# Patient Record
Sex: FEMALE | Race: BLACK OR AFRICAN AMERICAN | NOT HISPANIC OR LATINO | Employment: UNEMPLOYED | URBAN - METROPOLITAN AREA
[De-identification: names, ages, dates, MRNs, and addresses within clinical notes are randomized per-mention and may not be internally consistent; named-entity substitution may affect disease eponyms.]

---

## 2020-07-30 ENCOUNTER — OFFICE VISIT (OUTPATIENT)
Dept: URGENT CARE | Facility: URGENT CARE | Age: 65
End: 2020-07-30
Payer: COMMERCIAL

## 2020-07-30 ENCOUNTER — NURSE TRIAGE (OUTPATIENT)
Dept: NURSING | Facility: CLINIC | Age: 65
End: 2020-07-30

## 2020-07-30 ENCOUNTER — COMMUNICATION - HEALTHEAST (OUTPATIENT)
Dept: SCHEDULING | Facility: CLINIC | Age: 65
End: 2020-07-30

## 2020-07-30 VITALS
BODY MASS INDEX: 24.24 KG/M2 | TEMPERATURE: 99.4 F | WEIGHT: 142 LBS | DIASTOLIC BLOOD PRESSURE: 136 MMHG | OXYGEN SATURATION: 97 % | HEART RATE: 96 BPM | SYSTOLIC BLOOD PRESSURE: 230 MMHG | HEIGHT: 64 IN

## 2020-07-30 DIAGNOSIS — I16.9 HYPERTENSIVE CRISIS: ICD-10-CM

## 2020-07-30 DIAGNOSIS — B02.9 HERPES ZOSTER WITHOUT COMPLICATION: Primary | ICD-10-CM

## 2020-07-30 PROCEDURE — 99204 OFFICE O/P NEW MOD 45 MIN: CPT | Performed by: FAMILY MEDICINE

## 2020-07-30 PROCEDURE — 93000 ELECTROCARDIOGRAM COMPLETE: CPT | Performed by: FAMILY MEDICINE

## 2020-07-30 RX ORDER — VALACYCLOVIR HYDROCHLORIDE 1 G/1
1000 TABLET, FILM COATED ORAL 3 TIMES DAILY
Qty: 21 TABLET | Refills: 0 | Status: SHIPPED | OUTPATIENT
Start: 2020-07-30 | End: 2020-08-06

## 2020-07-30 ASSESSMENT — MIFFLIN-ST. JEOR: SCORE: 1184.11

## 2020-07-30 NOTE — PROGRESS NOTES
"Subjective:   Ubaldo Medina is a 65 year old female who presents for   Chief Complaint   Patient presents with     Urgent Care     Pt in clinic to have eval for painful facial rash.     Derm Problem     Rash started on Monday (3 days ago)  has not had anything like this before.   She denies any new creams or lotions. No fevers.   The pain has improved over the last few days   Right side of the face.   No facial droop seen. No pain of the eye or blurry vision.   No rash on the nose or forehead. Present on lateral right side and along the neck.     Hearing is without issue and no pain in the ear canal    Denies headache or visual changes.     No reported chest pain or breathing difficulties.     Remedies attempted: vaseline w/ ta     SH: [place], patient lives in [place]  PMH: [facility] she reports diagnosis of high blood pressure she has not been prescribed   PCP: no regular primary care appointments    There are no active problems to display for this patient.      Current Outpatient Medications   Medication     valACYclovir (VALTREX) 1000 mg tablet     No current facility-administered medications for this visit.      ROS:   ROS: 10 point ROS neg other than the symptoms noted above in the HPI.      Objective:   BP (!) 230/136 (BP Location: Left arm, Cuff Size: Adult Regular)   Pulse 96   Temp 99.4  F (37.4  C) (Oral)   Ht 1.626 m (5' 4\")   Wt 64.4 kg (142 lb)   SpO2 97%   BMI 24.37 kg/m  , Body mass index is 24.37 kg/m .  Gen:  NAD, well-nourished, sitting in chair comfortably  HEENT: EOMI, sclera anicteric, Head normocephalic, ; nares patent; moist mucous membranes, entry of the right ear vesicular lesions are seen -  in the canal there is small amounts of cerumen but no obvious lesions of the canal, normal TM, vesicular fluid filled rash/ present on right lateral neck  Neck: trachea midline, no thyromegaly  CV:  Hemodynamically stable, tachycardic, regular, no obvious murmurs  Pulm:  " no increased work of breathing , CTAB, no wheezes/rales/rhonchi   ABD: soft, non-distended  Neuro : no facial droop, symmetric facial expressions  Extrem: no cyanosis, edema or clubbing  Skin: no obvious rashes or abnormalities  Psych: Euthymic, linear thoughts, normal rate of speech            No results found for any visits on 07/30/20.  EKG: sinus tachy, no ST elevation, T wave inversion seen in multiple leads    Assessment & Plan:   Ubaldo Medina, 65 year old female who presents with:    Herpes zoster without complication  - valACYclovir (VALTREX) 1000 mg tablet  Dispense: 21 tablet; Refill: 0  C2/C3 dermatomal involvement   Tylenol recommended for discomfort.   Should monitor closely for possible development of americo hunt syndrome, at this present time no hearing difficulties or facial droop. No reported eye issues. Absent of rash on face or on tip of nose.     Consider dose adjustment based on findings after assessing renal function when prescribing valtrex.   Patient was given a Rx initially but after review of her vitals/BP she was recommended to follow the plan as below.     Hypertensive Crisis  At 1436 blood pressure readings 234/136, 240 /126 . Patient denies headache or visual changes. She does not have regular care with a primary care doctor. Care is fragmented between here and [facility]. We are  unable to check and assess renal function here in clinic given these circumstances and this severe BP I have recommended evaluation in the ED for assessment.     Spoke with [facility] ED at 1543 to discuss the case with the ED physician      Osbaldo Rodriguez MD   [facility]    The use of Dragon/Tr[employer] dictation services may have been used to construct the content in this note; any grammatical or spelling errors are non-intentional. Please contact the author of this note directly if you are in need of any clarification.

## 2020-07-30 NOTE — TELEPHONE ENCOUNTER
"    Additional Information    Negative: Difficult to awaken or acting confused (e.g., disoriented, slurred speech)    Negative: Sounds like a life-threatening emergency to the triager    Negative: Localized rash and doesn't match the SYMPTOMS of shingles    Negative: Back pain and doesn't match the SYMPTOMS of shingles    Negative: Shingles Vaccine (Recombinant Zoster Vaccine; RZV; Shingrix), questions about    Negative: Shingles rash of face and eye pain or blurred vision    Negative: Shingles rash on the eyelid or tip of the nose    Negative: Shingles rash and spots start appearing other places on body    Negative: Patient sounds very sick or weak to the triager    Shingles rash of face or ear and earache or ringing in the ear     Advised Urgent Care    Negative: Shingles rash of face and facial weakness    Negative: Shingles rash (matches SYMPTOMS) and weak immune system (e.g., HIV positive,  cancer chemotherapy, chronic steroid treatment, splenectomy) and NOT taking antiviral medication    Answer Assessment - Initial Assessment Questions  1. APPEARANCE of RASH: \"Describe the rash.\"       Painful blisters  2. LOCATION: \"Where is the rash located?\"       Right ear  3. ONSET: \"When did the rash start?\"       Past few days  4. ITCHING: \"Does the rash itch?\" If so, ask: \"How bad is the itch?\"  (Scale 1-10; or mild, moderate, severe)      yes  5. PAIN: \"Does the rash hurt?\" If so, ask: \"How bad is the pain?\"  (Scale 1-10; or mild, moderate, severe)      yes  6. OTHER SYMPTOMS: \"Do you have any other symptoms?\" (e.g., fever)      no  7. PREGNANCY: \"Is there any chance you are pregnant?\" \"When was your last menstrual period?\"      no    Protocols used: SHINGLES-A-OH      "

## 2020-08-07 ENCOUNTER — OFFICE VISIT - HEALTHEAST (OUTPATIENT)
Dept: INTERNAL MEDICINE | Facility: CLINIC | Age: 65
End: 2020-08-07

## 2020-08-07 ENCOUNTER — MEDICAL CORRESPONDENCE (OUTPATIENT)
Dept: HEALTH INFORMATION MANAGEMENT | Facility: CLINIC | Age: 65
End: 2020-08-07

## 2020-08-07 DIAGNOSIS — E11.9 TYPE 2 DIABETES MELLITUS WITHOUT COMPLICATION, WITHOUT LONG-TERM CURRENT USE OF INSULIN (H): ICD-10-CM

## 2020-08-07 DIAGNOSIS — Z12.31 VISIT FOR SCREENING MAMMOGRAM: ICD-10-CM

## 2020-08-07 DIAGNOSIS — I10 HYPERTENSION, UNSPECIFIED TYPE: ICD-10-CM

## 2020-08-07 DIAGNOSIS — R73.9 ELEVATED BLOOD SUGAR: ICD-10-CM

## 2020-08-07 LAB
ALBUMIN SERPL-MCNC: 3.8 G/DL (ref 3.5–5)
ALP SERPL-CCNC: 94 U/L (ref 45–120)
ALT SERPL W P-5'-P-CCNC: 32 U/L (ref 0–45)
AST SERPL W P-5'-P-CCNC: 21 U/L (ref 0–40)
BILIRUB DIRECT SERPL-MCNC: 0.2 MG/DL
BILIRUB SERPL-MCNC: 0.4 MG/DL (ref 0–1)
HBA1C MFR BLD: 13.2 %
PROT SERPL-MCNC: 6.9 G/DL (ref 6–8)

## 2020-08-07 ASSESSMENT — MIFFLIN-ST. JEOR: SCORE: 1197.25

## 2020-08-10 ENCOUNTER — AMBULATORY - HEALTHEAST (OUTPATIENT)
Dept: INTERNAL MEDICINE | Facility: CLINIC | Age: 65
End: 2020-08-10

## 2020-08-10 ENCOUNTER — NURSE TRIAGE (OUTPATIENT)
Dept: NURSING | Facility: CLINIC | Age: 65
End: 2020-08-10

## 2020-08-10 ENCOUNTER — COMMUNICATION - HEALTHEAST (OUTPATIENT)
Dept: SCHEDULING | Facility: CLINIC | Age: 65
End: 2020-08-10

## 2020-08-10 ENCOUNTER — TELEPHONE (OUTPATIENT)
Dept: ADMINISTRATIVE | Facility: CLINIC | Age: 65
End: 2020-08-10

## 2020-08-10 NOTE — TELEPHONE ENCOUNTER
Diabetes Education Scheduling Outreach #1:    Call to patient to schedule. Left message with phone number to call to schedule.    Plan for 2nd outreach attempt within 1 week.    Sanjana Jaimes  [facility]  Diabetes and Nutrition Scheduling

## 2020-08-11 NOTE — TELEPHONE ENCOUNTER
Diabetes Education Scheduling Outreach #2:    Call to patient to schedule. Left message with phone number to call to schedule.    Sanjana Jaimes  [facility]  Diabetes and Nutrition Scheduling

## 2020-08-20 ENCOUNTER — OFFICE VISIT - HEALTHEAST (OUTPATIENT)
Dept: INTERNAL MEDICINE | Facility: CLINIC | Age: 65
End: 2020-08-20

## 2020-08-20 ENCOUNTER — COMMUNICATION - HEALTHEAST (OUTPATIENT)
Dept: INTERNAL MEDICINE | Facility: CLINIC | Age: 65
End: 2020-08-20

## 2020-08-20 DIAGNOSIS — E11.9 TYPE 2 DIABETES MELLITUS WITHOUT COMPLICATION, WITHOUT LONG-TERM CURRENT USE OF INSULIN (H): ICD-10-CM

## 2020-08-20 DIAGNOSIS — I10 HYPERTENSION, UNSPECIFIED TYPE: ICD-10-CM

## 2020-08-20 LAB
CHOLEST SERPL-MCNC: 141 MG/DL
FASTING STATUS PATIENT QL REPORTED: YES
HDLC SERPL-MCNC: 41 MG/DL
LDLC SERPL CALC-MCNC: 58 MG/DL
TRIGL SERPL-MCNC: 210 MG/DL

## 2020-08-24 ENCOUNTER — COMMUNICATION - HEALTHEAST (OUTPATIENT)
Dept: INTERNAL MEDICINE | Facility: CLINIC | Age: 65
End: 2020-08-24

## 2020-08-24 ENCOUNTER — PATIENT OUTREACH (OUTPATIENT)
Dept: EDUCATION SERVICES | Facility: CLINIC | Age: 65
End: 2020-08-24
Payer: COMMERCIAL

## 2020-08-24 DIAGNOSIS — Z53.9 NO SHOW: Primary | ICD-10-CM

## 2020-08-24 DIAGNOSIS — E11.9 TYPE 2 DIABETES MELLITUS WITHOUT COMPLICATION, WITHOUT LONG-TERM CURRENT USE OF INSULIN (H): ICD-10-CM

## 2020-08-25 ENCOUNTER — RECORDS - HEALTHEAST (OUTPATIENT)
Dept: ADMINISTRATIVE | Facility: OTHER | Age: 65
End: 2020-08-25

## 2020-08-25 LAB — RETINOPATHY: NEGATIVE

## 2020-08-31 ENCOUNTER — RECORDS - HEALTHEAST (OUTPATIENT)
Dept: HEALTH INFORMATION MANAGEMENT | Facility: CLINIC | Age: 65
End: 2020-08-31

## 2020-09-04 ENCOUNTER — COMMUNICATION - HEALTHEAST (OUTPATIENT)
Dept: SCHEDULING | Facility: CLINIC | Age: 65
End: 2020-09-04

## 2020-09-08 ENCOUNTER — COMMUNICATION - HEALTHEAST (OUTPATIENT)
Dept: INTERNAL MEDICINE | Facility: CLINIC | Age: 65
End: 2020-09-08

## 2020-09-08 DIAGNOSIS — I10 HYPERTENSION, UNSPECIFIED TYPE: ICD-10-CM

## 2020-09-18 ENCOUNTER — COMMUNICATION - HEALTHEAST (OUTPATIENT)
Dept: INTERNAL MEDICINE | Facility: CLINIC | Age: 65
End: 2020-09-18

## 2020-09-18 DIAGNOSIS — E11.9 TYPE 2 DIABETES MELLITUS WITHOUT COMPLICATION, WITHOUT LONG-TERM CURRENT USE OF INSULIN (H): ICD-10-CM

## 2020-11-22 ENCOUNTER — HEALTH MAINTENANCE LETTER (OUTPATIENT)
Age: 65
End: 2020-11-22

## 2020-12-22 ENCOUNTER — OFFICE VISIT - HEALTHEAST (OUTPATIENT)
Dept: INTERNAL MEDICINE | Facility: CLINIC | Age: 65
End: 2020-12-22

## 2020-12-22 DIAGNOSIS — E11.00 TYPE 2 DIABETES MELLITUS WITH HYPEROSMOLARITY WITHOUT COMA, WITHOUT LONG-TERM CURRENT USE OF INSULIN (H): ICD-10-CM

## 2020-12-22 DIAGNOSIS — I10 HYPERTENSION, UNSPECIFIED TYPE: ICD-10-CM

## 2020-12-22 LAB
ANION GAP SERPL CALCULATED.3IONS-SCNC: 13 MMOL/L (ref 5–18)
BUN SERPL-MCNC: 12 MG/DL (ref 8–22)
CALCIUM SERPL-MCNC: 9.5 MG/DL (ref 8.5–10.5)
CHLORIDE BLD-SCNC: 105 MMOL/L (ref 98–107)
CO2 SERPL-SCNC: 25 MMOL/L (ref 22–31)
CREAT SERPL-MCNC: 0.62 MG/DL (ref 0.6–1.1)
GFR SERPL CREATININE-BSD FRML MDRD: >60 ML/MIN/1.73M2
GLUCOSE BLD-MCNC: 97 MG/DL (ref 70–125)
HBA1C MFR BLD: 5.5 %
POTASSIUM BLD-SCNC: 4.2 MMOL/L (ref 3.5–5)
SODIUM SERPL-SCNC: 143 MMOL/L (ref 136–145)

## 2020-12-22 ASSESSMENT — MIFFLIN-ST. JEOR: SCORE: 1180.01

## 2020-12-27 ENCOUNTER — COMMUNICATION - HEALTHEAST (OUTPATIENT)
Dept: INTERNAL MEDICINE | Facility: CLINIC | Age: 65
End: 2020-12-27

## 2021-05-30 ENCOUNTER — HEALTH MAINTENANCE LETTER (OUTPATIENT)
Age: 66
End: 2021-05-30

## 2021-06-04 VITALS
DIASTOLIC BLOOD PRESSURE: 82 MMHG | BODY MASS INDEX: 24.37 KG/M2 | HEART RATE: 109 BPM | SYSTOLIC BLOOD PRESSURE: 146 MMHG | OXYGEN SATURATION: 95 % | WEIGHT: 142 LBS

## 2021-06-04 VITALS
HEART RATE: 75 BPM | OXYGEN SATURATION: 96 % | WEIGHT: 146 LBS | SYSTOLIC BLOOD PRESSURE: 164 MMHG | BODY MASS INDEX: 24.92 KG/M2 | DIASTOLIC BLOOD PRESSURE: 108 MMHG | HEIGHT: 64 IN

## 2021-06-05 VITALS
BODY MASS INDEX: 24.28 KG/M2 | DIASTOLIC BLOOD PRESSURE: 88 MMHG | HEART RATE: 94 BPM | RESPIRATION RATE: 18 BRPM | OXYGEN SATURATION: 100 % | HEIGHT: 64 IN | WEIGHT: 142.2 LBS | TEMPERATURE: 96.5 F | SYSTOLIC BLOOD PRESSURE: 140 MMHG

## 2021-06-10 NOTE — PROGRESS NOTES
[facility] Clinic Note  Ubaldo Medina   65 y.o. female    Date of Visit: 8/7/2020  Chief Complaint   Patient presents with     Hospital Visit Follow Up     Hypertension     Diabetes     Assessment/Plan  1. Visit for screening mammogram  - Mammo Screening Bilateral; Future    2. Hypertension, unspecified type  Optimally controlled.  We will add amlodipine to her regimen of metoprolol.  Follow-up in 2 weeks at which time we will consider starting a diuretic and with intent to wean off the beta-blocker.  Provided patient instructions regarding goal systolic blood pressure less than 130 mmHg, especially in the setting of her diabetes.  - Hepatic Profile  - Lipid Profile; Future  - amLODIPine (NORVASC) 10 MG tablet; Start with 0.5 tab (5 mg) daily X 7 days. Then take 10 mg daily.  Dispense: 30 tablet; Refill: 11    3. Elevated blood sugar  4. Type 2 diabetes mellitus without complication, without long-term current use of insulin (H)  A1c 13.2.  Will start patient on metformin refer to diabetes educator.  Follow-up in 2 weeks to further discuss this diagnosis and plan moving forward to address it  - Glycosylated Hemoglobin A1c  - metFORMIN (GLUCOPHAGE) 500 MG tablet; Take 1 tablet (500 mg total) by mouth 2 (two) times a day with meals.  Dispense: 60 tablet; Refill: 11  - Ambulatory referral to Diabetes Education Program (CDE)    She declined referral to undergo screening colonoscopy.  Stating she will address it in the future.    Much or all of the text in this note was generated through the use of Dragon Dictate voice-to-text software. Errors in spelling or words which seem out of context are unintentional. Sound alike errors, in particular, may have escaped editing  Newton Scanlon MD    Return in about 2 weeks (around 8/21/2020), or if symptoms worsen or fail to improve.    Subjective  This 65 y.o. old female.  Presents with her sister/friend Sia who she authorizes with been in the exam room  "during this visit.  Seen in urgent care on 7/30/2020 for pain in the right neck and diagnosed with shingles.  Prescribed Valtrex but did not take it because she was given hydrocodone-acetaminophen in the ED for elevated systolic blood pressure of 230/136.  Was given IV metoprolol x5 and p.o. metoprolol was started on discharge.  She has a blood pressure machine at home with systolics ranging in the 160s since her discharge.  Is continue to taking the p.o. Lopressor twice daily without complication.  In March 2015, she went to the ED with complaints of back pain at that time blood pressure was 206/87.  Denies ever being told that she has high blood pressure or diabetes.  Denies any recent chest pain, blurry double vision, palpitations, headache, shortness of breath, cough, fever, sweats or chills.  EKG performed during this most recent ED visit that showed LVH and biatrial enlargement.  Denies any hx of stroke.  States she does not eat much salt.  Tries to walk every day and states that she stopped drinking coffee 1 month ago.  Family history pertinent for hypertension in her mother.  States her surgical history is unremarkable.    ROS A comprehensive review of systems was performed and was otherwise negative    Medications, allergies, and problem list were reviewed and updated    Exam  General appearance: Pleasant, nontoxic-appearing, no acute distress, alert and oriented x4  Vitals:    08/07/20 0809 08/07/20 0810   BP: (!) 164/108 (!) 164/108   Patient Site: Right Arm Right Arm   Patient Position: Sitting Sitting   Cuff Size: Adult Regular Adult Regular   Pulse: 75    SpO2: 96%    Weight: 146 lb (66.2 kg)    Height: 5' 4\" (1.626 m)    EYES: Eyelids, conjunctiva, and sclera were normal.   HEAD, EARS, NOSE, MOUTH, AND THROAT: Head and face were normal. Hearing was normal to voice.   NECK: Neck appearance was normal.   RESPIRATORY: no crackles, wheezing or rhonchi bilaterally  CARDIOVASCULAR: Regular S1 and S2.  " Radial pulses intact.  Trace dependent pitting edema below the shins bilaterally  GASTROINTESTINAL: NABS, soft, nontender, nondistended, no hepatomegaly  MUSCULOSKELETAL: Skeletal configuration was normal and muscle mass was normal for age. Joint appearance was overall normal.  SKIN/HAIR/NAILS: Skin color was normal.  No maceration between toes and ulcers on the soles of her feet or shins  NEUROLOGIC: Alert and oriented to person, place, time, and circumstance. Speech was normal. Motor strength was normal for age   Additional Information   Current Outpatient Medications   Medication Sig Dispense Refill     glipiZIDE (GLUCOTROL) 5 MG tablet Take 1 tablet (5 mg total) by mouth 2 (two) times a day before meals. 20 tablet 0     HYDROcodone-acetaminophen 5-325 mg per tablet Take 1 tablet by mouth every 4 (four) hours as needed for pain. 20 tablet 0     metoprolol tartrate (LOPRESSOR) 50 MG tablet Take 1 tablet (50 mg total) by mouth 2 (two) times a day. 20 tablet 0     amLODIPine (NORVASC) 10 MG tablet Start with 0.5 tab (5 mg) daily X 7 days. Then take 10 mg daily. 30 tablet 11     No current facility-administered medications for this visit.      No Known Allergies  Social History     Social History Narrative    Used to work at [employer]. Has 3 adult children, 3 grand children     Social History     Tobacco Use     Smoking status: Never Smoker     Smokeless tobacco: Never Used   Substance Use Topics     Alcohol use: Never     Frequency: Never     Drug use: Never     Family History   Problem Relation Age of Onset     Hypertension Mother      History reviewed. No pertinent surgical history.  Time: total time spent with the patient was 40 minutes of which >50% was spent in counseling and coordination of care

## 2021-06-10 NOTE — TELEPHONE ENCOUNTER
Started on Monday    Started behind the right ear    Not itching    It is painful    No fever    No cough    No shortness of breath    No loss of taste or smell    No chills    No body aches    No headaches    No sore throat    No nausea    No vomiting    No diarrhea    COVID 19 Nurse Triage Plan/Patient Instructions    Please be aware that novel coronavirus (COVID-19) may be circulating in the community. If you develop symptoms such as fever, cough, or SOB or if you have concerns about the presence of another infection including coronavirus (COVID-19), please contact your health care provider or visit [email or web address].     Disposition/Instructions    In-Person Visit with provider recommended. Reference Visit Selection Guide.    Thank you for taking steps to prevent the spread of this virus.  o Limit your contact with others.  o Wear a simple mask to cover your cough.  o Wash your hands well and often.    Resources    [facility]: About COVID-19: [email or web address]    CDC: What to Do If You're Sick: www.cdc.gov/coronavirus/2019-ncov/about/steps-when-sick.html    CDC: Ending Home Isolation: www.cdc.gov/coronavirus/2019-ncov/hcp/disposition-in-home-patients.html     CDC: Caring for Someone: www.cdc.gov/coronavirus/2019-ncov/if-you-are-sick/care-for-someone.html     [facility]: Interim Guidance for Hospital Discharge to Home: www.health.[facility].mn.us/diseases/coronavirus/hcp/hospdischarge.pdf    [facility] clinical trials (COVID-19 research studies): [email or web address].[email or web address]-clinical-trials     Below are the COVID-19 hotlines at the [place] Department of Health ([facility]). Interpreters are available.   o For health questions: Call 549-642-7213 or 1-836.349.3399 (7 a.m. to 7 p.m.)  o For questions about schools and childcare: Call 660-620-1546 or 1-758.658.8807 (7 a.m. to 7 p.m.)       Henna Asher RN   [facility] Medication Refill and Triage Nurse  11:17 AM  7/30/2020      Reason for  Disposition    Shingles rash and onset > 72 hours ago    Additional Information    Negative: Difficult to awaken or acting confused (e.g., disoriented, slurred speech)    Negative: Sounds like a life-threatening emergency to the triager    Negative: Shingles rash of face and eye pain or blurred vision    Negative: Shingles rash on the eyelid or tip of the nose    Negative: Shingles rash and spots start appearing other places on body    Negative: Patient sounds very sick or weak to the triager    Negative: Shingles rash (matches SYMPTOMS) and weak immune system (e.g., HIV positive,  cancer chemotherapy, chronic steroid treatment, splenectomy) and NOT taking antiviral medication    Negative: Shingles rash of face and facial weakness    Negative: Shingles rash of face or ear and earache or ringing in the ear    Negative: Fever > 100.5 F (38.1 C)    Negative: Shingles rash (matches SYMPTOMS) and onset within past 72 hours    Negative: Looks infected (spreading redness, pus) and no fever    Negative: Patient wants to be seen    Negative: SEVERE pain (e.g., excruciating)    Protocols used: SHINGLES-A-OH

## 2021-06-10 NOTE — TELEPHONE ENCOUNTER
Medication Question or Clarification  Who is calling: patient  What medication are you calling about (include dose and sig)?: .    BLOOD GLUCOSE MONITORING  1 each  0  8/20/2020      Sig: Dispense glucometer brand per patient's insurance at pharmacy discretion.  Check in the a.m. before breakfast and before dinner     Class: OTC       And     GLUCOSE BLOOD test strip     0  8/20/2020      Sig - Route: Use 1 each As Directed as needed. Dispense brand per patient's insurance at pharmacy discretion. - Miscellaneous     Class: OTC       And     LANCETS,THIN     0  8/20/2020      Sig - Route: Use 1 each As Directed as needed. Dispense brand per patient's insurance at pharmacy discretion. - Miscellaneous     Class: OTC       Who prescribed the medication?: Newton Scanlon MD  What is your question/concern?: Patient reports these medication are not @ the pharmacy. Patient requesting these prescriptions to be re-sent to [facility] on [address]. Please advise.  Requested Pharmacy: [facility]  Okay to leave a detailed message?: Yes

## 2021-06-10 NOTE — PROGRESS NOTES
[facility] Clinic Note  Ubaldo Medina   65 y.o. female    Date of Visit: 8/20/2020  Chief Complaint   Patient presents with     Hypertension     Labs Only     Lipids - fasting today     Follow-up     Assessment/Plan  1. Type 2 diabetes mellitus without complication, without long-term current use of insulin (H)  Newly diagnosed.  Motivated to improve her blood glucose levels.  Put in referral for ophthalmology.  Increase metformin to 1000 mg twice daily.  Would prefer not to be on insulin.  We will see in 2 months to recheck BMP.  Checking fasting lipids today.  - GLUCOSE BLOOD test strip; Use 1 each As Directed as needed. Dispense brand per patient's insurance at pharmacy discretion.  Dispense:  ; Refill: 0  - LANCETS,THIN; Use 1 each As Directed as needed. Dispense brand per patient's insurance at pharmacy discretion.  Dispense:  ; Refill: 0  - ALCOHOL PADS PadM; Apply 1 Pad topically 2 (two) times a day.  Dispense: 100 each; Refill: 0  - Ambulatory referral to Ophthalmology  - Lipid Profile  - BLOOD GLUCOSE MONITORING; Dispense glucometer brand per patient's insurance at pharmacy discretion.  Check in the a.m. before breakfast and before dinner  Dispense: 1 each; Refill: 0    2. Hypertension, unspecified type  Not optimally controlled.  Adding low-dose losartan daily.  Goal is to get systolic less than 120.  Tachycardic, potentially secondary to deconditioning.  Counseled to use her BP machine to check her blood pressure every Monday Wednesday Friday 2 hours after taking her antihypertensives.  Also set her up with my chart and let her know that she can let us know what her weekly BP and heart rate levels are for the next 2 weeks.  - losartan (COZAAR) 50 MG tablet; Take 0.5 tablets (25 mg total) by mouth daily.  Dispense: 60 tablet; Refill: 2  - Lipid Profile     Much or all of the text in this note was generated through the use of Dragon Dictate voice-to-text software. Errors in spelling or words  which seem out of context are unintentional. Sound alike errors, in particular, may have escaped editing  Newton Scanlon MD    Return in about 2 months (around 10/20/2020).    Subjective  This 65 y.o. old female. Here for 2 week follow-up. States she is checking her BP at home and they are good. Today, 155/95 but she takes it right away. Tolerating the 10 mg amlodipine. No CP, palpitations, headache, shortness of breath, cough. Does not drink coffee. Also is tolerating the metformin without issue. No abd pain, n/v. Is not checking the BP at home.     ROS A comprehensive review of systems was performed and was otherwise negative    Medications, allergies, and problem list were reviewed and updated    Exam  General appearance: Pleasant, nontoxic-appearing, no acute distress, alert and oriented x4  Vitals:    08/20/20 0711   BP: 146/82   Pulse:    SpO2:    NECK: Neck appearance was normal.   RESPIRATORY: Bilaterally with no crackles, wheezing or rhonchi  CARDIOVASCULAR: Tachycardic (did walk to clinic today).  Regular S1 and S2.  Radial pulses intact.  No edema.  GASTROINTESTINAL: NABS, soft, nontender, nondistended, no hepatomegaly  MUSCULOSKELETAL: Skeletal configuration was normal and muscle mass was normal for age. Joint appearance was overall normal.  SKIN/HAIR/NAILS: Skin color was normal.  Hair normal.  No maceration or ulcers between toes sores in her feet/soles/shins.  Sensation intact to monofilament bilaterally.  NEUROLOGIC: Alert and oriented to person, place, time, and circumstance. Speech was normal. Motor strength was normal for age   Additional Information   Current Outpatient Medications   Medication Sig Dispense Refill     amLODIPine (NORVASC) 10 MG tablet Start with 0.5 tab (5 mg) daily X 7 days. Then take 10 mg daily. 30 tablet 11     glipiZIDE (GLUCOTROL) 5 MG tablet Take 1 tablet (5 mg total) by mouth 2 (two) times a day before meals. 20 tablet 0     metFORMIN (GLUCOPHAGE) 500 MG tablet  Take 1 tablet (500 mg total) by mouth 2 (two) times a day with meals. 60 tablet 11     metoprolol tartrate (LOPRESSOR) 50 MG tablet Take 1 tablet (50 mg total) by mouth 2 (two) times a day. 20 tablet 0     ALCOHOL PADS PadM Apply 1 Pad topically 2 (two) times a day. 100 each 0     BLOOD GLUCOSE MONITORING Dispense glucometer brand per patient's insurance at pharmacy discretion.  Check in the a.m. before breakfast and before dinner 1 each 0     GLUCOSE BLOOD test strip Use 1 each As Directed as needed. Dispense brand per patient's insurance at pharmacy discretion.  0     LANCETS,THIN Use 1 each As Directed as needed. Dispense brand per patient's insurance at pharmacy discretion.  0     losartan (COZAAR) 50 MG tablet Take 0.5 tablets (25 mg total) by mouth daily. 60 tablet 2     No current facility-administered medications for this visit.      No Known Allergies  Social History     Social History Narrative    Used to work at [employer], recently laid off in March 2020. Has 3 adult children, 3 grand children     Social History     Tobacco Use     Smoking status: Never Smoker     Smokeless tobacco: Never Used   Substance Use Topics     Alcohol use: Never     Frequency: Never     Drug use: Never     Family History   Problem Relation Age of Onset     Hypertension Mother      No past surgical history on file.  Time: total time spent with the patient was 25 minutes of which >50% was spent in counseling and coordination of care

## 2021-06-10 NOTE — TELEPHONE ENCOUNTER
RN triage alicia from patient  She states she is calling because someone called her to check on her blood sugar  Patient denies knowing anything about her blood sugar.  Reviewed chart  Office visit 8/7/20  Patient was prescribed 2 medications one for blood pressure and one for blood sugar  Patient states she is aware of her blood pressure medication  Did review with patient that her provider also prescribed metformin and wants her to set up an appointment with diabetic educator    Patient stated understanding, she will check with her pharmacy for the metformin  And also provided the phone number to call to set up diabetic educator visit [phone]  Barbara Bui RN  [facility] Triage Nurse  12:02 PM  8/10/2020

## 2021-06-10 NOTE — TELEPHONE ENCOUNTER
I responded to labs via [employer].  I do not want to make any medication changes as her fasting lipid labs were good and within normal limits.  If she cannot see them, then a letter can be sent.  Thank you

## 2021-06-10 NOTE — TELEPHONE ENCOUNTER
Left message with below information. I did mention if she wants a copy for her records because she can not view them, to let us know and we can mail them.     Bing Berrios, CMA

## 2021-06-10 NOTE — TELEPHONE ENCOUNTER
Test Results  Who is calling?:  Patient  Who ordered the test:  Newton Scanlon MD  Type of test: Lab  Date of test:  08/20/2020  Where was the test performed:  In clinic  What are your questions/concerns?:  Pt would like results.  Please advise.  Okay to leave a detailed message?:  Yes

## 2021-06-10 NOTE — TELEPHONE ENCOUNTER
They are pended in this encounter.    Lucretia SEGURA LPN .......... 5:12 PM  08/24/20  [facility]

## 2021-06-10 NOTE — TELEPHONE ENCOUNTER
Dr. Scanlon,    RX's were set to OTC therefore did not go to the pharmacy.    Lucretia SEGURA LPN .......... 4:43 PM  08/24/20  [facility]

## 2021-06-11 NOTE — TELEPHONE ENCOUNTER
Pt called and want to let her PCP know her blood pressure readings from 8/24/20 to 9/4/20. Pt also wants PCP to know her blood sugar, and her blood sugar today is 126 mg/dL.    8/24/20  125/83  8/26/20  135/78  8/28/20  131/82  8/31/20  123/83  9/2/20  131/82  9/4/20  123/84

## 2021-06-11 NOTE — TELEPHONE ENCOUNTER
Prescription with update qty set up and sent to covering provider to sign and send to pharmacy. Left message to notify patient.    Bing Berrios, CMA

## 2021-06-11 NOTE — TELEPHONE ENCOUNTER
Directions in chart do state 2 tablets twice daily. Ok to set up prescription for you to sign?     Bing Berrios, CMA

## 2021-06-11 NOTE — TELEPHONE ENCOUNTER
Medication Question or Clarification  Who is calling: patient   What medication are you calling about (include dose and sig)?: metFORMIN (GLUCOPHAGE) 500 MG tablet  Who prescribed the medication?: Newton Scanlon MD    What is your question/concern?: patient is short of her metformin . She is to take 4 tablets daily , a quantity of 60 was sent to the pharmacy . Please update and resend prescription if appropriate.   Requested Pharmacy: [facility]  Okay to leave a detailed message?: Yes

## 2021-06-11 NOTE — TELEPHONE ENCOUNTER
Updating medication list.    Lucretia SEGURA LPN .......... 10:21 AM  09/08/20  [facility]

## 2021-06-13 NOTE — PROGRESS NOTES
[facility] Clinic Note  Ubaldo Medina   65 y.o. female    Date of Visit: 12/22/2020  Chief Complaint   Patient presents with     Diabetes     Hypertension     Assessment/Plan  1. Type 2 diabetes mellitus with hyperosmolarity without coma, without long-term current use of insulin (H)  We will check A1c today.  Remains on Metformin 1000 mg twice daily.  Encouraged to continue her 20 minutes daily of exercise in her improved diabetic diet.  Refused once again to be referred to diabetic educator.  Will be starting on statin after results come back  - Glycosylated Hemoglobin A1c    2. Hypertension, unspecified type  Not optimally controlled.  10-year ASCVD event risk of 18.8%.  Will increase losartan to 100 mg daily and check BMP today.  We will also add statin to her regimen, likely atorvastatin 10 mg.  - Basic Metabolic Panel  - losartan (COZAAR) 100 MG tablet; Take 1 tablet (100 mg total) by mouth daily.  Dispense: 90 tablet; Refill: 2     Declined mammogram or cologuard    The 10-year ASCVD risk score ([place]ck LANDRY Jr., et al., 2013) is: 18.8%    Values used to calculate the score:      Age: 65 years      Sex: Female      Is Non- : Yes      Diabetic: Yes      Tobacco smoker: No      Systolic Blood Pressure: 140 mmHg      Is BP treated: Yes      HDL Cholesterol: 41 mg/dL      Total Cholesterol: 141 mg/dL    Much or all of the text in this note was generated through the use of Dragon Dictate voice-to-text software. Errors in spelling or words which seem out of context are unintentional. Sound alike errors, in particular, may have escaped editing  Newton Scanlon MD    Return in about 4 months (around 4/22/2021), or if symptoms worsen or fail to improve, for Follow up.    Subjective  This 65 y.o. old female. Notes that her severance from [employer] ends this month. Doing well otherwise. Plan is hopefully go back to [employer]. Family doing well. BP runs in the 120s and that her BG  runs in the 90s-130s. Eating more vegetables and fish. Trying to avoid sugary goods. Fells good overall. Notes that she used to get tired and now she is getting up, doing her exercise and other daily routine. Saw [facility] in August 2020.     ROS A comprehensive review of systems was performed and was otherwise negative    Medications, allergies, and problem list were reviewed and updated    Exam  General appearance: Pleasant, nontoxic-appearing, no acute distress, alert and oriented x4  Vitals:    12/22/20 1003   BP: 140/88   Pulse: 94   Resp: 18   Temp: (!) 96.5  F (35.8  C)   SpO2: 100%   EYES: Eyelids, conjunctiva, and sclera were normal.   HEENT: Head normal. Hearing was normal to voice.    RESPIRATORY: Bilaterally with no crackles, wheezing or rhonchi  CARDIOVASCULAR: Regular S1 and S2. 2/6 HARIS in the RUSB. Radial pulses intact.  GASTROINTESTINAL: NABS, soft, NT, ND, no HSM  MUSCULOSKELETAL: Skeletal configuration was normal and muscle mass was normal for age.   SKIN/HAIR/NAILS: Skin color was normal. Hair normal.  NEUROLOGIC: Alert and oriented to person, place, time, and circumstance. Speech was normal.   Additional Information   Current Outpatient Medications   Medication Sig Dispense Refill     ALCOHOL PADS PadM Apply 1 Pad topically 2 (two) times a day. 100 each 0     amLODIPine (NORVASC) 10 MG tablet Start with 0.5 tab (5 mg) daily X 7 days. Then take 10 mg daily. 30 tablet 11     BLOOD GLUCOSE MONITORING Dispense glucometer brand per patient's insurance at pharmacy discretion.  Check in the a.m. before breakfast and before dinner 1 each 0     GLUCOSE BLOOD test strip Use 1 each As Directed 2 (two) times a day. Dispense brand per patient's insurance at pharmacy discretion. 100 strip 2     LANCETS,THIN Use 1 each As Directed 2 (two) times a day. Dispense brand per patient's insurance at pharmacy discretion. 100 each 2     lancing device Misc Use 1 each As Directed 2 (two) times a day. 1 each 3      losartan (COZAAR) 100 MG tablet Take 1 tablet (100 mg total) by mouth daily. 90 tablet 2     metFORMIN (GLUCOPHAGE) 500 MG tablet Take 2 tablets (1,000 mg total) by mouth 2 (two) times a day with meals. 360 tablet 3     No current facility-administered medications for this visit.      No Known Allergies  Social History     Social History Narrative    Used to work at [employer], recently laid off in March 2020. Has 3 adult children, 3 grand children     Social History     Tobacco Use     Smoking status: Never Smoker     Smokeless tobacco: Never Used   Substance Use Topics     Alcohol use: Never     Frequency: Never     Drug use: Never     Family History   Problem Relation Age of Onset     Hypertension Mother      No past surgical history on file.  Time: total time spent with the patient was 25 minutes of which >50% was spent in counseling and coordination of care

## 2021-06-16 PROBLEM — E11.9 TYPE 2 DIABETES MELLITUS WITHOUT COMPLICATION, WITHOUT LONG-TERM CURRENT USE OF INSULIN (H): Status: ACTIVE | Noted: 2020-07-30

## 2021-06-16 PROBLEM — B02.9 HERPES ZOSTER WITHOUT COMPLICATION: Status: ACTIVE | Noted: 2020-07-30

## 2021-06-16 PROBLEM — I10 HYPERTENSION, UNSPECIFIED TYPE: Status: ACTIVE | Noted: 2020-07-30

## 2021-06-18 NOTE — PATIENT INSTRUCTIONS - HE
Patient Instructions by Newton Scanlon MD at 8/7/2020  8:00 AM     Author: Newton Scanlon MD Service: -- Author Type: Physician    Filed: 8/7/2020  8:53 AM Encounter Date: 8/7/2020 Status: Addendum    : Newton Scanlon MD (Physician)    Related Notes: Original Note by Newton Scanlon MD (Physician) filed at 8/7/2020  8:50 AM       Patient Education   I have started you on an additional blood pressure medication. Follow the instructions and I would like to see you in 2 weeks for follow-up and to check your fasting cholesterol, so please come fasting.    Ask the pharmacy regarding the cost of the amlodipine and whether it would be cheaper at [facility] ($4 program) or another pharmacy  Established High Blood Pressure    High blood pressure (hypertension) is a chronic disease. Often, healthcare providers dont know what causes it. But it can be caused by certain health conditions and medicines.  If you have high blood pressure, you may not have any symptoms. If you do have symptoms, they may include headache, dizziness, changes in your vision, chest pain, and shortness of breath. But even without symptoms, high blood pressure thats not treated raises your risk for heart attack, heart failure, and stroke. High blood pressure is a serious health risk and shouldnt be ignored.  Blood pressure measurements are given as 2 numbers. Systolic blood pressure is the upper number. This is the pressure when the heart contracts. Diastolic blood pressure is the lower number. This is the pressure when the heart relaxes between beats. You will see your blood pressure readings written together. For example, a person with a systolic pressure of 118 and a diastolic pressure of 78 will have 118/78 written in the medical record.  Blood pressure is categorized as normal, elevated, or stage 1 or stage 2 high blood pressure:    Normal blood pressure is systolic of less than 120 and diastolic of  less than 80 (120/80)    Elevated blood pressure is systolic of 120 to 129 and diastolic less than 80    Stage 1 high blood pressure is systolic is 130 to 139 or diastolic between 80 to 89    Stage 2 high blood pressure is when systolic is 140 or higher or the diastolic is 90 or higher  Home care  If you have high blood pressure, follow these home care guidelines to help lower your blood pressure. If you are taking medicines for high blood pressure, these methods may reduce or end your need for medicines in the future.    Start a weight-loss program if you are overweight. You are barely overweight    Cut back on how much salt you get in your diet. Heres how to do this:  ? Dont eat foods that have a lot of salt. These include olives, pickles, smoked meats, and salted potato chips.  ? Dont add salt to your food at the table.  ? Use only small amounts of salt when cooking.    Start an exercise program. Talk with your healthcare provider about the type of exercise program that would be best for you. It doesn't have to be hard. Even brisk walking for 20 minutes 3 times a week is a good form of exercise.    Limit how much caffeine you get in your diet. Switch to caffeine-free products.    Learn how to handle stress. This is an important part of any program to lower blood pressure. Learn about relaxation methods like meditation, yoga, or biofeedback.    If your provider prescribed medicines, take them exactly as directed. Missing doses may cause your blood pressure get out of control.    If you miss a dose or doses, check with your healthcare provider or pharmacist about what to do.    Consider buying an automatic blood pressure machine to check your blood pressure at home. Ask your provider for a recommendation. You can get one of these at most pharmacies.     The American Heart Association recommends the following guidelines for home blood pressure monitoring:    Don't smoke or drink coffee for 30 minutes before taking  your blood pressure.    Go to the bathroom before the test.    Relax for 5 minutes before taking the measurement.    Sit with your back supported (don't sit on a couch or soft chair); keep your feet on the floor uncrossed. Place your arm on a solid flat surface (like a table) with the upper part of the arm at heart level. Place the middle of the cuff directly above the bend of the elbow. Check the monitor's instruction manual for an illustration.    Take multiple readings. When you measure, take 2 to 3 readings one minute apart and record all of the results.    Take your blood pressure at the same time every day, or as your healthcare provider recommends.    Record the date, time, and blood pressure reading.    Take the record with you to your next medical appointment. If your blood pressure monitor has a built-in memory, simply take the monitor with you to your next appointment.    Call your provider if you have several high readings. Don't be frightened by a single high blood pressure reading, but if you get several high readings, check in with your healthcare provider.    Note: When blood pressure reaches a systolic (top number) of 180 or higher OR diastolic (bottom number) of 110 or higher, seek emergency medical treatment.  Follow-up care  You will need to see your healthcare provider regularly. This is to check your blood pressure and to make changes to your medicines. Make a follow-up appointment as directed. Bring the record of your home blood pressure readings to the appointment.  When to seek medical advice  Call your healthcare provider right away if any of these occur:    Blood pressure reaches a systolic (upper number) of 180 or higher OR a diastolic (bottom number) of 110 or higher    Chest pain or shortness of breath    Severe headache    Throbbing or rushing sound in the ears    Nosebleed    Sudden severe pain in your belly (abdomen)    Extreme drowsiness, confusion, or fainting    Dizziness or  spinning sensation (vertigo)    Weakness of an arm or leg or one side of the face    You have problems speaking or seeing

## 2021-06-18 NOTE — PATIENT INSTRUCTIONS - HE
Patient Instructions by Newton Scanlon MD at 8/20/2020  7:20 AM     Author: Newton Scanlon MD Service: -- Author Type: Physician    Filed: 8/20/2020  7:46 AM Encounter Date: 8/20/2020 Status: Addendum    : Newton Scanlon MD (Physician)    Related Notes: Original Note by Newton Scanlon MD (Physician) filed at 8/20/2020  7:36 AM       I want you to start taking 2 tablets of metformin in the AM and 1 tablet in the PM X 2 weeks. Then start taking 2 tablets in the AM and 2 tablets in the PM. That is a total of 1000 mg two times a day. Please read the diabetes information below and the information in the booklet we provided.     I have started you on another blood pressure medication - Losartan. Take it once daily. Remember to stay well hydrated, drinking water. Take it addition to the amlodipine    I have sent a Rx for diabetic supplies to the pharmacy downstairs. Ask them for the least expensive options and whether it is covered by your insurance, and if so, how much it costs    You will need to set up an eye exam with an eye doctor because of your diabetes. I will try to set you up with an ophthalmologist for an eye exam. Technically, you have to see one at least once a year. Make sure it is covered by your insurance.     I want to see you in 2-3 months to recheck your labs. I hope by that time, your new insurance has been activated. Use the in[employer]Depth system to communicate with us okay!    When you finish your current bottles of multivitamins, only get new ones that are not gummies that contain any sugar    Patient Education     Diet: Diabetes  Food is an important tool that you can use to control diabetes and stay healthy. Eating well-balanced meals in the correct amounts will help you control your blood glucose levels and prevent low blood sugar reactions. It will also help you reduce the health risks of diabetes. There is no one specific diet that is right for  everyone with diabetes. But there are general guidelines to follow. A registered dietitian (RD) will create a tailored diet approach thats just right for you. He or she will also help you plan healthy meals and snacks. If you have any questions, call your dietitian for advice.   Guidelines for success  Talk with your healthcare provider before starting a diabetes diet or weight loss program. If you haven't talked with a dietitian yet, ask your provider for a referral. The following guidelines can help you succeed:    Select foods from the 6 food groups below. Your dietitian will help you find food choices within each group. He or she will also show you serving sizes and how many servings you can have at each meal.  ? Grains, beans, and starchy vegetables  ? Vegetables  ? Fruit  ? Milk or yogurt  ? Meat, poultry, fish, or tofu  ? Healthy fats    Check your blood sugar levels as directed by your provider. Take any medicine as prescribed by your provider.    Learn to read food labels and pick the right portion sizes.    Eat only the amount of food in your meal plan. Eat about the same amount of food at regular times each day. Dont skip meals. Eat meals 4 to 5 hours apart, with snacks in between.    Limit alcohol. It raises blood sugar levels. Drink water or calorie-free diet drinks that use safe sweeteners.    Eat less fat to help lower your risk of heart disease. Use nonfat or low-fat dairy products and lean meats. Avoid fried foods. Use cooking oils that are unsaturated, such as olive, canola, or peanut oil.    Talk with your dietitian about safe sugar substitutes.    Avoid added salt. It can contribute to high blood pressure, which can cause heart disease. People with diabetes already have a risk of high blood pressure and heart disease.    Stay at a healthy weight. If you need to lose weight, cut down on your portion sizes. But dont skip meals. Exercise is an important part of any weight management program. Talk  with your provider about an exercise program thats right for you.    For more information about the best diet plan for you, talk with a registered dietitian (RD). To find an RD in your area, contact:  ? Academy of Nutrition and Dietetics www.eatright.org  ? The American Diabetes Association [phone] www.diabetes.org

## 2021-06-18 NOTE — PATIENT INSTRUCTIONS - HE
Patient Instructions by Newton Scanlon MD at 12/22/2020 10:20 AM     Author: Newton Scanlon MD Service: -- Author Type: Physician    Filed: 12/22/2020 10:52 AM Encounter Date: 12/22/2020 Status: Addendum    : Newton Scanlon MD (Physician)    Related Notes: Original Note by Newton Scanlon MD (Physician) filed at 12/22/2020 10:44 AM       Change your losartan to 100 mg (take 2 of the 20 mg tablets) daily. I have sent a prescription for the 100 mg tablet to the [facility].  We will check your kidney function, electrolytes and hemoglobin A1c today.     Please think about getting a mammogram, pap smear and the cologuard cancer screening test. This is all for prevention    Pap Test     A speculum is used to open the vagina so cells can be taken from the cervix.   Schedule your test for a time when you will not be having your menstrual period. If youre menstruating at the time of your appointment, call your healthcare provider to ask if you should reschedule.  For 48 hours before the test    Do not douche.    Do not use vaginal medicines, creams, or spermicides.  For 24 hours before the test    Do not have sexual intercourse.  How the test is done  1. You lie on an exam table with your feet in stirrups (foot rests). This is the usual position for a pelvic exam (an exam of the reproductive organs).  2. Your healthcare provider uses a speculum (a metal or plastic instrument) to gently open the vagina.  3. Cells are taken from the cervix with a small spatula or rubber broom. A small brush may then be used to remove cells from inside the cervical canal. You may feel pressure or slight discomfort.  Preserving the sample  There are 2 ways to preserve the sample after it is taken:    Traditional preservation. With this method, the sample is smeared directly onto a glass microscope slide. The sample is then sent to a lab to be analyzed.    Liquid-based preservation. The sample is  placed in a special preservative solution. At the lab, cervical cells are  from blood and mucous cells and spread onto a slide. Screening for human papillomavirus (HPV) can also be done using the same sample.  After the test  Youre free to go! There is a slight chance of light bleeding or spotting. Your healthcare provider will tell you when to expect your test results.  Getting your results  Ask your healthcare provider how you will receive your results. You should always obtain and understand results of any testing you have done. These may be obtained by phone, mail, or through online access if available:    Normal result. The cells in the sample appear healthy. Have your next Pap test as recommended by your healthcare provider.    Abnormal result. The lab saw something unusual in your sample. Talk with your healthcare provider about what the results mean. You may need to repeat the Pap test or have other tests to evaluate the problem.     Patient Education     Mammography    Mammography is an X-ray exam of your breast tissue. The image it makes is called a mammogram. A mammogram can help find problems with your breasts, such as cysts or cancer. Mammography is the best breast cancer screening tool available.  Be proactive  Have screening mammograms and professional breast exams as often as your healthcare provider advises. Also, be sure you know how your breasts normally look and feel. This makes it easier to notice any changes. Report changes to your healthcare provider as soon as possible.   How do I get ready for a mammogram?     Schedule the test for 1 week after your period. Your breasts are less sore and dense then.    Make sure your clinic gets images of your last mammogram if it was done somewhere else. This lets the provider compare the 2 sets of images for any changes.    On the morning of your test, dont use deodorant, powder, or perfume.    Wear a top that you can take off easily.    What  happens during a mammogram?     You will need to undress from the waist up.    The technologist will position your breast to get the best test results.    Each of your breasts will be compressed one at a time. This helps get the most complete X-ray image.    Your breasts will be repositioned to get at least 2 separate views of each breast.    What happens after a mammogram?    More X-rays or an ultrasound are sometimes needed. If not done at the time of your initial mammogram, youll be called to schedule them.    You should get your test results in writing. Ask about this at your appointment.    Have mammograms as often as your healthcare provider advises.    What to tell your healthcare provider  Tell your healthcare provider if:    Youre pregnant or think you may be pregnant    You have breast implants    You have any scars or moles on or near your breasts    Youve had a breast biopsy or surgery    Youre breastfeeding

## 2021-06-20 NOTE — LETTER
Letter by Newton Scanlon MD at      Author: Newton Scanlon MD Service: -- Author Type: --    Filed:  Encounter Date: 9/4/2020 Status: (Other)         September 8, 2020     Ubaldo Medina    Patient: Ubaldo Medina   MR Number: [id number]   YOB: 1955   Date of Visit: 9/4/2020       Dear Ms Medina:    Thank you for providing information regarding your blood pressure and your blood sugar.  Everything for the most part looks really good.  If possible could you please increase your losartan dose to 50 mg daily (1 tablet).  Also continue to manage your diabetes as it is, but let us know if you start having any low blood sugar levels, sometimes with symptoms such as headache, dizziness, sweating or fatigue.  I look forward to seeing you in October.  Please take care.       If you have questions, please do not hesitate to call me.    Sincerely,    Newton Scanlon MD.

## 2021-06-21 NOTE — LETTER
Letter by Newton Scanlon MD at      Author: Newton Scanlon MD Service: -- Author Type: --    Filed:  Encounter Date: 12/27/2020 Status: (Other)         Ubaldo Medina  [address]  [place] [address]       December 27, 2020     Dear Ms. Medina,    Below are the results from your recent visit:    Resulted Orders   Glycosylated Hemoglobin A1c   Result Value Ref Range    Hemoglobin A1c 5.5 <=5.6 %   Basic Metabolic Panel   Result Value Ref Range    Sodium 143 136 - 145 mmol/L    Potassium 4.2 3.5 - 5.0 mmol/L    Chloride 105 98 - 107 mmol/L    CO2 25 22 - 31 mmol/L    Anion Gap, Calculation 13 5 - 18 mmol/L    Glucose 97 70 - 125 mg/dL    Calcium 9.5 8.5 - 10.5 mg/dL    BUN 12 8 - 22 mg/dL    Creatinine 0.62 0.60 - 1.10 mg/dL    GFR MDRD Af Amer >60 >60 mL/min/1.73m2    GFR MDRD Non Af Amer >60 >60 mL/min/1.73m2    Narrative    Fasting Glucose reference range is 70-99 mg/dL per  American Diabetes Association (ADA) guidelines.     Your A1c looks amazing and your kidney function and electrolyte levels are all normal.  Keep up the good work.  We will may recheck your A1c in 3 months, if it remains this low then we can start to wean you off the Metformin.     Please call with questions or contact us using [employer].    Sincerely,        Electronically signed by Newton Scanlon MD

## 2021-07-01 ENCOUNTER — OFFICE VISIT - HEALTHEAST (OUTPATIENT)
Dept: FAMILY MEDICINE | Facility: CLINIC | Age: 66
End: 2021-07-01

## 2021-07-01 DIAGNOSIS — E11.9 TYPE 2 DIABETES MELLITUS WITHOUT COMPLICATION, WITHOUT LONG-TERM CURRENT USE OF INSULIN (H): ICD-10-CM

## 2021-07-01 DIAGNOSIS — I10 HYPERTENSION, UNSPECIFIED TYPE: ICD-10-CM

## 2021-07-01 LAB
ALBUMIN SERPL-MCNC: 4.3 G/DL (ref 3.5–5)
ALP SERPL-CCNC: 70 U/L (ref 45–120)
ALT SERPL W P-5'-P-CCNC: 12 U/L (ref 0–45)
ANION GAP SERPL CALCULATED.3IONS-SCNC: 17 MMOL/L (ref 5–18)
AST SERPL W P-5'-P-CCNC: 14 U/L (ref 0–40)
BILIRUB SERPL-MCNC: 0.5 MG/DL (ref 0–1)
BUN SERPL-MCNC: 11 MG/DL (ref 8–22)
CALCIUM SERPL-MCNC: 10.4 MG/DL (ref 8.5–10.5)
CHLORIDE BLD-SCNC: 104 MMOL/L (ref 98–107)
CHOLEST SERPL-MCNC: 168 MG/DL
CO2 SERPL-SCNC: 20 MMOL/L (ref 22–31)
CREAT SERPL-MCNC: 0.67 MG/DL (ref 0.6–1.1)
CREAT UR-MCNC: 47.9 MG/DL
FASTING STATUS PATIENT QL REPORTED: YES
GFR SERPL CREATININE-BSD FRML MDRD: >60 ML/MIN/1.73M2
GLUCOSE BLD-MCNC: 85 MG/DL (ref 70–125)
HBA1C MFR BLD: 6 %
HDLC SERPL-MCNC: 39 MG/DL
LDLC SERPL CALC-MCNC: 64 MG/DL
MICROALBUMIN UR-MCNC: 1.09 MG/DL (ref 0–1.99)
MICROALBUMIN/CREAT UR: 22.8 MG/G
POTASSIUM BLD-SCNC: 4 MMOL/L (ref 3.5–5)
PROT SERPL-MCNC: 7 G/DL (ref 6–8)
SODIUM SERPL-SCNC: 141 MMOL/L (ref 136–145)
TRIGL SERPL-MCNC: 326 MG/DL

## 2021-07-01 ASSESSMENT — MIFFLIN-ST. JEOR: SCORE: 1173.21

## 2021-07-06 VITALS
TEMPERATURE: 99.3 F | HEIGHT: 64 IN | HEART RATE: 101 BPM | BODY MASS INDEX: 24.02 KG/M2 | DIASTOLIC BLOOD PRESSURE: 90 MMHG | OXYGEN SATURATION: 98 % | RESPIRATION RATE: 18 BRPM | WEIGHT: 140.7 LBS | SYSTOLIC BLOOD PRESSURE: 145 MMHG

## 2021-07-08 ENCOUNTER — COMMUNICATION - HEALTHEAST (OUTPATIENT)
Dept: INTERNAL MEDICINE | Facility: CLINIC | Age: 66
End: 2021-07-08

## 2021-07-08 NOTE — TELEPHONE ENCOUNTER
Telephone Encounter by Rehana Lamb CMA at 7/8/2021  8:26 AM     Author: Rehana Lamb CMA Service: -- Author Type: Certified Medical Assistant    Filed: 7/8/2021  8:26 AM Encounter Date: 7/8/2021 Status: Signed    : Rehana Lamb CMA (Certified Medical Assistant)       ----- Message from Kyle Soot MD sent at 7/8/2021  2:37 AM CDT -----  Pls call:   The 10-year ASCVD risk score ([place] LANDRY Jr., et al., 2013) is: 24.5%  Recommending to start statin (lipid lowering medication) to mitigate the risk.

## 2021-07-08 NOTE — TELEPHONE ENCOUNTER
Telephone Encounter by Rehana Lamb CMA at 7/8/2021  8:27 AM     Author: Rehana Lamb CMA Service: -- Author Type: Certified Medical Assistant    Filed: 7/8/2021  8:30 AM Encounter Date: 7/8/2021 Status: Signed    : Rehana Lamb CMA (Certified Medical Assistant)       Left message for pt to call back. Please relay results from Dr. Soto:     The 10-year ASCVD risk score (Maralbrock LANDRY Jr., et al., 2013) is: 24.5%   Recommending to start statin (lipid lowering medication) to mitigate the risk.     Letter sent via "[employer]t.

## 2021-07-08 NOTE — PROGRESS NOTES
"Progress Notes by Kyle Soto MD at 7/1/2021 12:00 PM     Author: Kyle Soto MD Service: -- Author Type: Physician    Filed: 7/8/2021  2:39 AM Encounter Date: 7/1/2021 Status: Signed    : Kyle Soto MD (Physician)           Assessment & Plan     Type 2 diabetes mellitus without complication, without long-term current use of insulin (H)  Recheck:   - Glycosylated Hemoglobin A1c  - Comprehensive Metabolic Panel  - Lipid Profile  The 10-year ASCVD risk score (Maralbrock LANDRY Jr., et al., 2013) is: 24.5%  Recommending to start statin (lipid lowering medication) to mitigate the risk.    - Microalbumin, Random Urine    Refill:   - metFORMIN (GLUCOPHAGE) 500 MG tablet; Take 2 tablets (1,000 mg total) by mouth 2 (two) times a day with meals.  - LANCETS,THIN; Use 1 each As Directed 2 (two) times a day. Dispense brand per patient's insurance at pharmacy discretion.    - GLUCOSE BLOOD test strip; Use 1 each As Directed 2 (two) times a day. Dispense brand per patient's insurance at pharmacy discretion.    Hypertension, unspecified type  Elevated blood pressure   Continue current management and monitor blood pressure     Refill:   - losartan (COZAAR) 100 MG tablet; Take 1 tablet (100 mg total) by mouth daily.  - amLODIPine (NORVASC) 10 MG tablet; Take 1 tablet (10 mg total) by mouth daily.             Return in about 6 months (around 1/1/2022) for Recheck.    Kyle Soto MD  [facility]       Britton Medina is 66 y.o. and presents today for med check and follow up of Diabetes and htn,  requesting med refills and is due for labs.   She is reporting no other issues or concerns and all her meds are well tolerated.         Objective    /90   Pulse (!) 101   Temp 99.3  F (37.4  C)   Resp 18   Ht 5' 4\" (1.626 m)   Wt 140 lb 11.2 oz (63.8 kg)   SpO2 98%   BMI 24.15 kg/m    Body mass index is 24.15 kg/m .     Physical Exam  General Appearance:    " Alert, well hydrated, no distress   Throat:   mucous membranes moist, pharynx normal without lesions   Neck:   Supple, symmetrical, trachea midline, no adenopathy;     thyroid:  no enlargement/tenderness/nodules;    Lungs:     clear to auscultation, no wheezes, rales or rhonchi, symmetric air entry     Heart:    Regular rate and rhythm, S1 and S2 normal, no murmur, rub   or gallop, no edema    Skin:   Skin color, texture, turgor normal, no rashes or lesions              So you so much interaction recording just sounds with likely elevated by

## 2021-07-22 NOTE — LETTER
Letter by Klye Soto MD at      Author: Kyle Soto MD Service: -- Author Type: --    Filed:  Encounter Date: 7/8/2021 Status: (Other)         Ubaldo Medina  [address]  [place] [address]             July 8, 2021         Dear Ms. Medina,    Below are the results from your recent visit:    Resulted Orders   Glycosylated Hemoglobin A1c   Result Value Ref Range    Hemoglobin A1c 6.0 (H) <=5.6 %   Comprehensive Metabolic Panel   Result Value Ref Range    Sodium 141 136 - 145 mmol/L    Potassium 4.0 3.5 - 5.0 mmol/L    Chloride 104 98 - 107 mmol/L    CO2 20 (L) 22 - 31 mmol/L    Anion Gap, Calculation 17 5 - 18 mmol/L    Glucose 85 70 - 125 mg/dL    BUN 11 8 - 22 mg/dL    Creatinine 0.67 0.60 - 1.10 mg/dL    GFR MDRD Af Amer >60 >60 mL/min/1.73m2    GFR MDRD Non Af Amer >60 >60 mL/min/1.73m2    Bilirubin, Total 0.5 0.0 - 1.0 mg/dL    Calcium 10.4 8.5 - 10.5 mg/dL    Protein, Total 7.0 6.0 - 8.0 g/dL    Albumin 4.3 3.5 - 5.0 g/dL    Alkaline Phosphatase 70 45 - 120 U/L    AST 14 0 - 40 U/L    ALT 12 0 - 45 U/L    Narrative    Fasting Glucose reference range is 70-99 mg/dL per  American Diabetes Association (ADA) guidelines.   Lipid Profile   Result Value Ref Range    Triglycerides 326 (H) <=149 mg/dL    Cholesterol 168 <=199 mg/dL    LDL Calculated 64 <=129 mg/dL    HDL Cholesterol 39 (L) >=50 mg/dL    Patient Fasting > 8hrs? Yes    Microalbumin, Random Urine   Result Value Ref Range    Microalbumin, Random Urine 1.09 0.00 - 1.99 mg/dL    Creatinine, Urine 47.9 mg/dL    Microalbumin/Creatinine Ratio Random Urine 22.8 (H) <=19.9 mg/g    Narrative    Microalbumin, Random Urine  <2.0 mg/dL . . . . . . . . Normal  3.0-30.0 mg/dL . . . . . . Microalbuminuria  >30.0 mg/dL . . . . . .  . Clinical Proteinuria    Microalbumin/Creatinine Ratio, Random Urine  <20 mg/g . . . . .. . . . Normal   mg/g . . . . . . . Microalbuminuria  >300 mg/g . . . . . . . . Clinical Proteinuria             The 10-year ASCVD risk score (Maralbrock LANDRY Jr., et al., 2013) is: 24.5%    Recommending to start statin (lipid lowering medication) to mitigate the risk    Please call with questions or contact us using [employer].    Sincerely,        Electronically signed by Kyle Soto MD

## 2021-07-22 NOTE — LETTER
Letter by Kyle Soto MD at      Author: Kyle Soto MD Service: -- Author Type: --    Filed:  Encounter Date: 7/8/2021 Status: (Other)         Ubaldo Medina  [address]  [place] [address]             July 8, 2021         Dear Ms. Medina,    Below are the results from your recent visit:    Resulted Orders   Glycosylated Hemoglobin A1c   Result Value Ref Range    Hemoglobin A1c 6.0 (H) <=5.6 %   Comprehensive Metabolic Panel   Result Value Ref Range    Sodium 141 136 - 145 mmol/L    Potassium 4.0 3.5 - 5.0 mmol/L    Chloride 104 98 - 107 mmol/L    CO2 20 (L) 22 - 31 mmol/L    Anion Gap, Calculation 17 5 - 18 mmol/L    Glucose 85 70 - 125 mg/dL    BUN 11 8 - 22 mg/dL    Creatinine 0.67 0.60 - 1.10 mg/dL    GFR MDRD Af Amer >60 >60 mL/min/1.73m2    GFR MDRD Non Af Amer >60 >60 mL/min/1.73m2    Bilirubin, Total 0.5 0.0 - 1.0 mg/dL    Calcium 10.4 8.5 - 10.5 mg/dL    Protein, Total 7.0 6.0 - 8.0 g/dL    Albumin 4.3 3.5 - 5.0 g/dL    Alkaline Phosphatase 70 45 - 120 U/L    AST 14 0 - 40 U/L    ALT 12 0 - 45 U/L    Narrative    Fasting Glucose reference range is 70-99 mg/dL per  American Diabetes Association (ADA) guidelines.   Lipid Profile   Result Value Ref Range    Triglycerides 326 (H) <=149 mg/dL    Cholesterol 168 <=199 mg/dL    LDL Calculated 64 <=129 mg/dL    HDL Cholesterol 39 (L) >=50 mg/dL    Patient Fasting > 8hrs? Yes    Microalbumin, Random Urine   Result Value Ref Range    Microalbumin, Random Urine 1.09 0.00 - 1.99 mg/dL    Creatinine, Urine 47.9 mg/dL    Microalbumin/Creatinine Ratio Random Urine 22.8 (H) <=19.9 mg/g    Narrative    Microalbumin, Random Urine  <2.0 mg/dL . . . . . . . . Normal  3.0-30.0 mg/dL . . . . . . Microalbuminuria  >30.0 mg/dL . . . . . .  . Clinical Proteinuria    Microalbumin/Creatinine Ratio, Random Urine  <20 mg/g . . . . .. . . . Normal   mg/g . . . . . . . Microalbuminuria  >300 mg/g . . . . . . . . Clinical Proteinuria             Stable diabetic labs    Continue current management    Please call with questions or contact us using [employer].    Sincerely,        Electronically signed by Kyle Soto MD

## 2021-09-19 ENCOUNTER — HEALTH MAINTENANCE LETTER (OUTPATIENT)
Age: 66
End: 2021-09-19

## 2021-11-14 ENCOUNTER — HEALTH MAINTENANCE LETTER (OUTPATIENT)
Age: 66
End: 2021-11-14

## 2022-01-09 ENCOUNTER — HEALTH MAINTENANCE LETTER (OUTPATIENT)
Age: 67
End: 2022-01-09

## 2022-02-08 ENCOUNTER — NURSE TRIAGE (OUTPATIENT)
Dept: NURSING | Facility: CLINIC | Age: 67
End: 2022-02-08
Payer: COMMERCIAL

## 2022-02-08 ENCOUNTER — TELEPHONE (OUTPATIENT)
Dept: NURSING | Facility: CLINIC | Age: 67
End: 2022-02-08
Payer: COMMERCIAL

## 2022-02-08 ENCOUNTER — OFFICE VISIT (OUTPATIENT)
Dept: FAMILY MEDICINE | Facility: CLINIC | Age: 67
End: 2022-02-08
Payer: COMMERCIAL

## 2022-02-08 VITALS
DIASTOLIC BLOOD PRESSURE: 80 MMHG | BODY MASS INDEX: 25.49 KG/M2 | HEART RATE: 100 BPM | WEIGHT: 149.31 LBS | SYSTOLIC BLOOD PRESSURE: 156 MMHG | OXYGEN SATURATION: 97 % | HEIGHT: 64 IN

## 2022-02-08 DIAGNOSIS — E11.65 TYPE 2 DIABETES MELLITUS WITH HYPERGLYCEMIA, WITHOUT LONG-TERM CURRENT USE OF INSULIN (H): ICD-10-CM

## 2022-02-08 DIAGNOSIS — I10 ESSENTIAL HYPERTENSION: Primary | ICD-10-CM

## 2022-02-08 DIAGNOSIS — E78.2 MIXED HYPERLIPIDEMIA: ICD-10-CM

## 2022-02-08 LAB
ANION GAP SERPL CALCULATED.3IONS-SCNC: 14 MMOL/L (ref 5–18)
BUN SERPL-MCNC: 12 MG/DL (ref 8–22)
CALCIUM SERPL-MCNC: 9.4 MG/DL (ref 8.5–10.5)
CHLORIDE BLD-SCNC: 102 MMOL/L (ref 98–107)
CO2 SERPL-SCNC: 23 MMOL/L (ref 22–31)
CREAT SERPL-MCNC: 0.7 MG/DL (ref 0.6–1.1)
GFR SERPL CREATININE-BSD FRML MDRD: >90 ML/MIN/1.73M2
GLUCOSE BLD-MCNC: 163 MG/DL (ref 70–125)
HBA1C MFR BLD: 6.5 % (ref 0–5.6)
POTASSIUM BLD-SCNC: 4.2 MMOL/L (ref 3.5–5)
SODIUM SERPL-SCNC: 139 MMOL/L (ref 136–145)
TSH SERPL DL<=0.005 MIU/L-ACNC: 2.82 UIU/ML (ref 0.3–5)

## 2022-02-08 PROCEDURE — 83036 HEMOGLOBIN GLYCOSYLATED A1C: CPT | Performed by: STUDENT IN AN ORGANIZED HEALTH CARE EDUCATION/TRAINING PROGRAM

## 2022-02-08 PROCEDURE — 99214 OFFICE O/P EST MOD 30 MIN: CPT | Performed by: STUDENT IN AN ORGANIZED HEALTH CARE EDUCATION/TRAINING PROGRAM

## 2022-02-08 PROCEDURE — 80048 BASIC METABOLIC PNL TOTAL CA: CPT | Performed by: STUDENT IN AN ORGANIZED HEALTH CARE EDUCATION/TRAINING PROGRAM

## 2022-02-08 PROCEDURE — 36415 COLL VENOUS BLD VENIPUNCTURE: CPT | Performed by: STUDENT IN AN ORGANIZED HEALTH CARE EDUCATION/TRAINING PROGRAM

## 2022-02-08 PROCEDURE — 84443 ASSAY THYROID STIM HORMONE: CPT | Performed by: STUDENT IN AN ORGANIZED HEALTH CARE EDUCATION/TRAINING PROGRAM

## 2022-02-08 RX ORDER — AMLODIPINE BESYLATE 10 MG/1
10 TABLET ORAL DAILY
Qty: 90 TABLET | Refills: 0 | Status: SHIPPED | OUTPATIENT
Start: 2022-02-08

## 2022-02-08 RX ORDER — AMLODIPINE BESYLATE 10 MG/1
10 TABLET ORAL DAILY
Qty: 90 TABLET | Refills: 0 | Status: CANCELLED | OUTPATIENT
Start: 2022-02-08

## 2022-02-08 RX ORDER — LOSARTAN POTASSIUM 100 MG/1
100 TABLET ORAL DAILY
COMMUNITY
Start: 2021-11-27 | End: 2022-02-08

## 2022-02-08 RX ORDER — LOSARTAN POTASSIUM 100 MG/1
100 TABLET ORAL DAILY
Qty: 90 TABLET | Refills: 0 | Status: SHIPPED | OUTPATIENT
Start: 2022-02-08

## 2022-02-08 RX ORDER — ATORVASTATIN CALCIUM 20 MG/1
20 TABLET, FILM COATED ORAL DAILY
Qty: 90 TABLET | Refills: 3 | Status: SHIPPED | OUTPATIENT
Start: 2022-02-08

## 2022-02-08 RX ORDER — AMLODIPINE BESYLATE 10 MG/1
10 TABLET ORAL DAILY
COMMUNITY
Start: 2021-11-01 | End: 2022-02-08

## 2022-02-08 ASSESSMENT — MIFFLIN-ST. JEOR: SCORE: 1212.28

## 2022-02-08 NOTE — TELEPHONE ENCOUNTER
Pt called in states she missed call from the clinic.   The Pt call was for the medication refill.  Appointment is made for the Pt.  No other concern at this time.      Anthony Mcneil Nurse Advisor 2/8/2022 1:53 PM

## 2022-02-08 NOTE — TELEPHONE ENCOUNTER
Reason for Call:  Medication refill:    Do you use a [facility]? No   Name of the pharmacy and phone number for the current request[facility] [address]  Name of the medication requested: Amlodipine 10 mg    Other request: [facility] as refill requests have not been responded to.  Pt has been out of medication since Friday 02/04/2022    Can we leave a detailed message on this number? YES    Phone number patient can be reached at: Home number on file [phone] (home)    Best Time: Any    Call taken on 2/8/2022 at 12:23 PM by Dora Fuentes

## 2022-02-08 NOTE — TELEPHONE ENCOUNTER
Telephone call:     Outbound call to patient to discuss scheduling appointment in clinic for blood pressure recheck and med refill.  No answer; unable to leave message as mailbox is full.     Melissa Boyer RN   02/08/22 12:46 PM  [facility] Nurse Advisor

## 2022-02-08 NOTE — LETTER
February 17, 2022      Ubaldo Nerimica  [address]   [place] [address]        Dear ,    Your diabetes has gotten worse. I would recommend focusing on increasing exercise levels, improving diet and we can recheck it in 3 months.    Resulted Orders   Basic metabolic panel  (Ca, Cl, CO2, Creat, Gluc, K, Na, BUN)   Result Value Ref Range    Sodium 139 136 - 145 mmol/L    Potassium 4.2 3.5 - 5.0 mmol/L    Chloride 102 98 - 107 mmol/L    Carbon Dioxide (CO2) 23 22 - 31 mmol/L    Anion Gap 14 5 - 18 mmol/L    Urea Nitrogen 12 8 - 22 mg/dL    Creatinine 0.70 0.60 - 1.10 mg/dL    Calcium 9.4 8.5 - 10.5 mg/dL    Glucose 163 (H) 70 - 125 mg/dL    GFR Estimate >90 >60 mL/min/1.73m2      Comment:      Effective December 21, 2021 eGFRcr in adults is calculated using the 2021 CKD-EPI creatinine equation which includes age and gender (Brain et al., NEJ, DOI: 10.1056/PONRpl7444048)   Hemoglobin A1c   Result Value Ref Range    Hemoglobin A1C 6.5 (H) 0.0 - 5.6 %      Comment:      Normal <5.7%   Prediabetes 5.7-6.4%    Diabetes 6.5% or higher     Note: Adopted from ADA consensus guidelines.   TSH with free T4 reflex   Result Value Ref Range    TSH 2.82 0.30 - 5.00 uIU/mL       If you have any questions or concerns, please call the clinic at the number listed above.       Sincerely,      Susi Shay, DO

## 2022-02-08 NOTE — PROGRESS NOTES
Assessment & Plan   Problem List Items Addressed This Visit        Endocrine    Type 2 diabetes mellitus (H)    Relevant Medications    metFORMIN (GLUCOPHAGE) 500 MG tablet    atorvastatin (LIPITOR) 20 MG tablet    Other Relevant Orders    Hemoglobin A1c (Completed)      Other Visit Diagnoses     Essential hypertension    -  Primary    Relevant Medications    amLODIPine (NORVASC) 10 MG tablet    losartan (COZAAR) 100 MG tablet    Other Relevant Orders    Basic metabolic panel  (Ca, Cl, CO2, Creat, Gluc, K, Na, BUN) (Completed)    TSH with free T4 reflex (Completed)    Mixed hyperlipidemia        Relevant Medications    atorvastatin (LIPITOR) 20 MG tablet           Patient presents today mainly for a medication refill.  Patient has been out of her amlodipine and losartan since Saturday.  Notes that she has not been feeling any different.  Denies any chest pain, palpitations, headaches, lower extremity edema, abdominal fullness or decreased exercise tolerance.  Her blood pressures at home have also been slightly elevated since she ran out of her blood pressure medication.  Has no side effects from the medications that she is on.  Patient needs updated BMP-we will get that today in the setting of essential hypertension.  Amlodipine and losartan refilled.  Will follow up with her and 3 months for BP recheck.    Patient has a history of type 2 diabetes with last HbA1c at 6.0.  Currently only on Metformin.  Due for repeat HbA1c.  Ordered today.    Patient has hypercholesterolemia.  ASCVD score 1f9.2%.  Not on a statin.  Patient education and counseling provided.  Ultimately patient is amenable to starting moderate intensity statin.    Return in about 3 months (around 5/8/2022) for BP - Follow with Dr. NICKY Shay, [facility]    Britton Conner is a 66 year old who presents for the following health issues: Med refill         The 10-year ASCVD risk score (Maral FRANTZ Jr., et al., 2013)  "is: 19.2%    Values used to calculate the score:      Age: 66 years      Sex: Female      Is Non- : No      Diabetic: Yes      Tobacco smoker: No      Systolic Blood Pressure: 156 mmHg      Is BP treated: Yes      HDL Cholesterol: 39 mg/dL      Total Cholesterol: 168 mg/dL      Review of Systems   As per HPI       Objective    BP (!) 156/80 (BP Location: Right arm, Patient Position: Sitting)   Pulse 100   Ht 1.626 m (5' 4\")   Wt 67.7 kg (149 lb 5 oz)   SpO2 97%   BMI 25.63 kg/m    Body mass index is 25.63 kg/m .  Physical Exam   GENERAL: healthy, alert and no distress  NECK: no adenopathy, no asymmetry, masses, or scars and thyroid normal to palpation  RESP: lungs clear to auscultation - no rales, rhonchi or wheezes  CV: regular rate and rhythm, normal S1 S2, no S3 or S4, no murmur, click or rub, no peripheral edema and peripheral pulses strong  ABDOMEN: soft, nontender, no hepatosplenomegaly, no masses and bowel sounds normal  MS: no gross musculoskeletal defects noted, no edema        "

## 2022-02-28 ENCOUNTER — TELEPHONE (OUTPATIENT)
Dept: FAMILY MEDICINE | Facility: CLINIC | Age: 67
End: 2022-02-28
Payer: COMMERCIAL

## 2022-02-28 NOTE — TELEPHONE ENCOUNTER
Patient would like to know why her medication doses have not decreased?  She wants to know why she still has to take the same amount of amlodipine, losartan and metformin.    She shares that she has to take two pills of Metformin twice per day and expresses this is a lot.    Declines offer to schedule phone visit or office visit with PCP.    Patient is requesting call back at .  Okay to leave message.

## 2022-03-01 NOTE — TELEPHONE ENCOUNTER
Spoke with patient regarding message from Dr. Shay. Patient is scheduled with Dr. GARCIA on May 26

## 2022-03-01 NOTE — TELEPHONE ENCOUNTER
Her medications were not changed at the last visit because her BP was still high and her HbA1c is about the same at the last time it was checked. If she wants to start titrating off her meds, I would suggest follow up with PCP ( Dr. GARCIA) in 3 months for updated vitals and repeat Hb1c. That will help guide further med titration.

## 2022-05-01 ENCOUNTER — HEALTH MAINTENANCE LETTER (OUTPATIENT)
Age: 67
End: 2022-05-01

## 2022-06-02 ENCOUNTER — TELEPHONE (OUTPATIENT)
Dept: FAMILY MEDICINE | Facility: CLINIC | Age: 67
End: 2022-06-02
Payer: COMMERCIAL

## 2022-06-02 NOTE — TELEPHONE ENCOUNTER
Left message changing arrival time for visit with Dr. Shay on 8/15/22.  New arrival time is 10:10 am to fit provider's schedule change.  Asked pt to call back and reschedule if this does not work.

## 2022-06-26 ENCOUNTER — HEALTH MAINTENANCE LETTER (OUTPATIENT)
Age: 67
End: 2022-06-26

## 2022-09-20 ENCOUNTER — PATIENT OUTREACH (OUTPATIENT)
Dept: GERIATRIC MEDICINE | Facility: CLINIC | Age: 67
End: 2022-09-20

## 2022-09-20 NOTE — LETTER
September 20, 2022    UBALDO EDWARDS  [address]   [place] [address]    Dear  Ubaldo,    Welcome to [facility] s MSC+ health program. My name is BASHIR Niño, DANYELLE. I am your MSC+ care coordinator. You are eligible for Care Coordination through [facility] MSC+ pla.    As your care coordinator, we ll:    Meet to go over your care coordination benefits    Talk about your physical and mental health care needs     Review your preventative care needs    Create a plan that meets your needs with the services you choose    What happens next?  I ll call you soon to introduce myself and tell you more about my role. We ll then plan time to go over your health and safety needs. Our goal is to keep you as healthy and independent as possible.    Soon, you will receive a new MSC+ member identification (ID) card from [facility]. When you receive it, please use this card along with your [place] [facility] card and Prescription Drug Coverage Program card. When you receive, it please use this card where you get your health services. If you have Medicare, you will need to show your Medicare card when you get health services.    The MSC+ care coordination program is voluntary and offered to you at no cost. If you wish to stop being in the care coordination program or have questions, call me at [phone]. If you reach my voicemail, leave a message and your phone number. TTY users, call the [place] [employer] at 086 or [phone] (xevokd-ci-njpkcv relay service).    Sincerely,      BASHIR Niño, DANYELLE    E-mail: [email or web address]  Phone: [phone]      [employer]    [id number] accepted   [id number]       [id number] (07/2022)

## 2022-09-26 ENCOUNTER — PATIENT OUTREACH (OUTPATIENT)
Dept: GERIATRIC MEDICINE | Facility: CLINIC | Age: 67
End: 2022-09-26

## 2022-09-26 NOTE — LETTER
September 27, 2022    UBALDO EDWARDS  [address]   [place] [address]        Dear Ubaldo:    As a member of [facility]'s MSC+ program, we offer a health risk assessment at no cost to you. I know you don't want to have the assessment right now. If you change your mind, please call me at the number below.    Who performs the health risk assessment?  A [facility] Care Coordinator performs the assessment. Our Care Coordinators can also help you understand your benefits. They can tell you about services to aid you at home, such as managing your care with your doctors if your health worsens.    Our Care Coordinators are here for you if you need:    Support for activities you used to do by yourself (including making meals, bathing and paying bills)    Equipment for bathroom or home safety    Help finding a new place to live    Information on staying healthy, preventing falls and immunizations    Questions?  If you have questions, or you would like to do he assessment, call me at [phone]. TTY users call [phone]. I'm here from 8am to 5pm. I may reach out to you again soon.       Sincerely,         BASHIR Niño, [facility]    E-mail: [email or web address]  Phone: [phone]      [employer]      \<[id number] accepted  [id number]>    [id number] (21/2021)

## 2022-09-27 NOTE — PROGRESS NOTES
"[facility] Care Coordination Contact    Per CC, mailed client a \"Refusal of Home Visit\" letter.    Alana Faye  Care Management Specialist  [facility]  [phone]     "

## 2022-10-31 NOTE — PROGRESS NOTES
[facility] Care Coordination Contact    Member became effective with  Mariah on 9/1/2022 with Juan MSC+.  Previous Health Plan: MA/Fee For Service  Previous Care System: [facility]  Previous care coordinators name and number: NA, no MMIS  Waiver Type: N/A  Last MMIS Entry: Date NA and Type NA  MMIS visit date (and type) if different from above: NA  Services Listed in MMIS: NA  UTF received: No UTF to request     Evy Adams  Care Management Specialist  [facility]  [phone]      
Car

## 2022-11-21 ENCOUNTER — HEALTH MAINTENANCE LETTER (OUTPATIENT)
Age: 67
End: 2022-11-21

## 2022-12-25 ENCOUNTER — HEALTH MAINTENANCE LETTER (OUTPATIENT)
Age: 67
End: 2022-12-25

## 2023-04-16 ENCOUNTER — HEALTH MAINTENANCE LETTER (OUTPATIENT)
Age: 68
End: 2023-04-16

## 2023-06-02 ENCOUNTER — HEALTH MAINTENANCE LETTER (OUTPATIENT)
Age: 68
End: 2023-06-02

## 2023-07-03 ENCOUNTER — PATIENT OUTREACH (OUTPATIENT)
Dept: GERIATRIC MEDICINE | Facility: CLINIC | Age: 68
End: 2023-07-03
Payer: COMMERCIAL

## 2023-07-03 NOTE — LETTER
July 3, 2023    UBALDO EDWARDS  [address]   [place] [address]    Dear Ubaldo:     I m your care coordinator. I ve been unable to reach you by phone. I am writing to ask you or your authorized representative to call me at [phone]. If you reach my voicemail, leave a message with your daytime phone number. Include a date and time that I can call you. If you are hearing impaired, call the [place] [facility] at 460 or [phone] (gepflm-ve-mzovpe relay service).    The reason I am trying to reach you is:     [] To schedule an assessment  [x] For your six (6)-month check-in  [] Other:      Please call me as soon as you receive this letter. I look forward to speaking with you.    Sincerely,    Estella Tanner RN    E-mail: [email or web address]  Phone: [phone]      [employer]        [id number] accepted  [id number]                                                                        B  (08/2022)

## 2023-07-03 NOTE — PROGRESS NOTES
"[facility] Care Coordination Contact    Per CC, mailed client an \"Unable to Contact\" letter.    Ramila Yuan  Care Management Specialist  [facility]  [phone]     "

## 2023-07-03 NOTE — PROGRESS NOTES
[facility] Care Coordination Contact    Called member to complete six month assessment and left a message requesting a return call. This is the 4th attempt to reach member for a 6th month outreach. Estella Tanner RN,BSN  [facility] Case Coordinator   [phone]

## 2023-07-31 ENCOUNTER — PATIENT OUTREACH (OUTPATIENT)
Dept: GERIATRIC MEDICINE | Facility: CLINIC | Age: 68
End: 2023-07-31
Payer: COMMERCIAL

## 2023-07-31 NOTE — PROGRESS NOTES
[facility] Care Coordination Contact    Called  Ubaldo   to schedule annual HRA home visit. Her voicemail is full, so unable to leave a message.   Her voicemail has her name listed, so appears to be the correct phone number for her.   E-mailed her as well with e-mail on file.     BRE Adams, Manager   [facility]  [phone]

## 2023-07-31 NOTE — LETTER
August 11, 2023    UBALDO EDWARDS  [address]   [place] [address]    Dear Ubaldo:     I m your care coordinator. I ve been unable to reach you by phone. I am writing to ask you or your authorized representative to call me at [phone]. If you reach my voicemail, leave a message with your daytime phone number. Include a date and time that I can call you. If you are hearing impaired, call the [place] [facility] at 812 or [phone] (guywwa-hv-jsrupj relay service).    The reason I am trying to reach you is:     [x] To schedule an assessment  [] For your six (6)-month check-in  [] Other:      Please call me as soon as you receive this letter. I look forward to speaking with you.    Sincerely,    BRE Adams    E-mail: [email or web address]  Phone: [phone]      [employer]        [id number] accepted  [id number]                                                                        B  (08/2022)

## 2023-07-31 NOTE — Clinical Note
We are the [facility] care coordinators for Ubaldo through her [employer] insurance.  We were unable to reach her for an annual assessment.  FYI.

## 2023-08-10 NOTE — PROGRESS NOTES
[facility] Care Coordination Contact    Called member to schedule annual HRA home visit. Left a message requesting a return call to schedule HRA.    Per history requested CMS to send UTR letter.       BRE Adams, Manager   [facility]  [phone]

## 2023-08-11 NOTE — PROGRESS NOTES
"[facility] Care Coordination Contact    Per CC, mailed client an \"Unable to Contact\" letter.    Yady Marrero  Care Management Specialist  [facility]  [phone]        "

## 2023-08-21 NOTE — PROGRESS NOTES
[facility] Care Coordination Contact    Completed 4 attempts to reach client with no response.  Member is officially unable to contact effective today.  Completed MMIS entry.  Completed health plan required [facility] POC.    Follow-up Plan: CC will attempt to reach member in six months.    This CC note routed to PCP, Susi Shay.    BRE Adams, Manager   [facility]  [phone]

## 2023-09-16 ENCOUNTER — HEALTH MAINTENANCE LETTER (OUTPATIENT)
Age: 68
End: 2023-09-16

## 2023-09-22 ENCOUNTER — PATIENT OUTREACH (OUTPATIENT)
Dept: GERIATRIC MEDICINE | Facility: CLINIC | Age: 68
End: 2023-09-22
Payer: COMMERCIAL

## 2023-09-22 NOTE — LETTER
September 22, 2023    UBALDO EDWARDS  [address]   [place] [address]      Dear Ubaldo:    As a member of [facility] (MSC+) you are provided a care coordinator. I will be your new care coordinator as of 10/01/2023. I will be calling you soon to see how you are doing and determine your needs.    If you have any questions, please feel free to call me at [phone]. If you reach my voice mail, please leave a message and your phone number. If you are hearing impaired, please call the [place] [facility] at 631 or [phone] (irweao-tk-ilyork relay service).    I look forward to speaking with you soon.    Sincerely,          Sofie Rutledge RN, BSN, PHN  [phone]  [email or web address]          MSC+ [facility]  [id number] DHS Approved ([id number])  [id number] (11/18)

## 2023-09-22 NOTE — PROGRESS NOTES
[facility] Care Coordination Contact    Internal CC change effective 10/01/2023.  Mailed member CC Change letter.  Additional tasks to be completed by CMS include: update database & EPIC, enter CC Change in MMIS, and move member file.

## 2023-09-22 NOTE — LETTER
September 22, 2023    UBALDO EDWARDS  [address]   [place] [address]      Dear Ubaldo:    As a member of [facility] ([facility] you are provided a care coordinator. I will be your new care coordinator as of 10/01/2023. I will be calling you soon to see how you are doing and determine your needs.    If you have any questions, please feel free to call me at [phone]. If you reach my voice mail, please leave a message and your phone number. If you are hearing impaired, please call the [place] Relay at [phone] or [phone] (dicnee-tt-lvpupp relay service).    I look forward to speaking with you soon.    Sincerely,          Sofie Rutledge RN, BSN, PHN  [phone]  [email or web address]          [facility]  [id number] DHS Approved ([id number])  [id number] (11/18)

## 2023-10-20 NOTE — TELEPHONE ENCOUNTER
----- Message from Rosanne Ledezma sent at 10/20/2023  8:22 AM EDT -----  Regarding: FW: Ap Cope ([birth year]) Lab Work Results Neyda Ma 10/18  Contact: [phone]    ----- Message -----  From: Ap Cope  Sent: 10/20/2023   6:08 AM EDT  To: #  Subject: Ap Cope ([birth year]) Lab Work Results Neyda Ma #    Good morning Andrea Schooling it was good seeing you Wednesday. Can you please give me a call re 's lab work. Thank you. Aquilino Teague  [phone] Started on Monday    Started behind the right ear    It is painful    No fever    No cough    No shortness of breath    No loss of taste or smell    No chills    No body aches    No headaches    No sore throat    No nausea    No vomiting    No diarrhea    COVID 19 Nurse Triage Plan/Patient Instructions    Please be aware that novel coronavirus (COVID-19) may be circulating in the community. If you develop symptoms such as fever, cough, or SOB or if you have concerns about the presence of another infection including coronavirus (COVID-19), please contact your health care provider or visit [email or web address].     Disposition/Instructions    In-Person Visit with provider recommended. Reference Visit Selection Guide.    Thank you for taking steps to prevent the spread of this virus.  o Limit your contact with others.  o Wear a simple mask to cover your cough.  o Wash your hands well and often.    Resources    [facility]: About COVID-19: [email or web address]    CDC: What to Do If You're Sick: www.cdc.gov/coronavirus/2019-ncov/about/steps-when-sick.html    CDC: Ending Home Isolation: www.cdc.gov/coronavirus/2019-ncov/hcp/disposition-in-home-patients.html     CDC: Caring for Someone: www.cdc.gov/coronavirus/2019-ncov/if-you-are-sick/care-for-someone.html     [facility]: Interim Guidance for Hospital Discharge to Home: www.health.[facility].[place]/diseases/coronavirus/hcp/hospdischarge.pdf    [facility] clinical trials (COVID-19 research studies): clinicalaffairs.[facility]-clinical-trials     Below are the COVID-19 hotlines at the [place] Department of Health ([facility]). Interpreters are available.   o For health questions: Call 100-552-9203 or 1-873.978.8715 (7 a.m. to 7 p.m.)  o For questions about schools and childcare: Call 236-432-7018 or 1-903.515.2620 (7 a.m. to 7 p.m.)                   Henna Asher RN    Additional Information    Negative: Difficult to awaken or acting confused (e.g.,  disoriented, slurred speech)    Negative: Sounds like a life-threatening emergency to the triager    Negative: Patient sounds very sick or weak to the triager    [1] Shingles rash of face or ear AND [2] earache or ringing in the ear    Negative: [1] Shingles rash (matches SYMPTOMS) AND [2] weak immune system (e.g., HIV positive,  cancer chemotherapy, chronic steroid treatment, splenectomy) AND [3] NOT taking antiviral medication    Negative: Shingles rash on the eyelid or tip of the nose    Negative: [1] Shingles rash of face AND [2] eye pain or blurred vision    Negative: [1] Shingles rash of face AND [2] facial weakness    Protocols used: SHINGLES-A-AH

## 2024-02-03 ENCOUNTER — HEALTH MAINTENANCE LETTER (OUTPATIENT)
Age: 69
End: 2024-02-03

## 2024-02-29 ENCOUNTER — PATIENT OUTREACH (OUTPATIENT)
Dept: GERIATRIC MEDICINE | Facility: CLINIC | Age: 69
End: 2024-02-29
Payer: COMMERCIAL

## 2024-02-29 NOTE — LETTER
March 1, 2024    UBALDO EDWARDS  [address]   [place] [address]    Dear Ubaldo:     I m your care coordinator. I ve been unable to reach you by phone. I am writing to ask you or your authorized representative to call me at [phone]. If you reach my voicemail, leave a message with your daytime phone number. Include a date and time that I can call you. If you are hearing impaired, call the [place] Relay at 508 or [phone] (ogiqie-fa-qxoxur relay service).    The reason I am trying to reach you is:     [] To schedule an assessment  [x] For your six (6)-month check-in  [] Other:      Please call me as soon as you receive this letter. I look forward to speaking with you.    Sincerely,      Sofie Rutledge RN, BSN, PHN  [phone]  [email or web address]        [id number] accepted  [id number]                                                                        B  (08/2022)

## 2024-02-29 NOTE — PROGRESS NOTES
[facility] Care Coordination Contact    Member called 4 attempts to review 6 months careplan review and left messages with care coordinator call number to return call. Will call again on annual review in 6 months.     Sofie Rutledge RN  [facility]  [phone]

## 2024-02-29 NOTE — PROGRESS NOTES
[facility] Care Coordination Contact    Member called 4 attempts to review 6 months careplan review and left messages with care coordinator call number to return call. Will call again on annual review in 6 months.    Sofie Rutledge RN  [facility]  [phone]

## 2024-03-01 NOTE — PROGRESS NOTES
"[facility] Care Coordination Contact    Per CC, mailed client an \"Unable to Contact\" letter.      Yady Marrero  Care Management Specialist  [facility]  [phone]       "

## 2024-06-22 ENCOUNTER — HEALTH MAINTENANCE LETTER (OUTPATIENT)
Age: 69
End: 2024-06-22

## 2024-07-31 ENCOUNTER — PATIENT OUTREACH (OUTPATIENT)
Dept: GERIATRIC MEDICINE | Facility: CLINIC | Age: 69
End: 2024-07-31
Payer: COMMERCIAL

## 2024-07-31 NOTE — Clinical Note
Hello,  Member was unable to reach for community support services and health risk assessment.Care coordinator will contact member in six month review. Thank You, Mindy DOMINGUEZ

## 2024-07-31 NOTE — PROGRESS NOTES
[facility] Care Coordination Contact    Completed 4 attempts to reach client with no response.  Member is officially unable to contact effective today.  Completed MMIS entry.  Completed health plan required [facility] POC.    Follow-up Plan: CC will attempt to reach member in six months.    This CC note routed to PCP, Susi Shay.    Sofie Rutledge RN  [facility]  [phone]

## 2024-08-20 ENCOUNTER — PATIENT OUTREACH (OUTPATIENT)
Dept: GERIATRIC MEDICINE | Facility: CLINIC | Age: 69
End: 2024-08-20
Payer: COMMERCIAL

## 2024-08-20 NOTE — Clinical Note
Hello, FYI:Member MA termed and unable to reach was dis enrolled from community support services. Thank You, Mindy DOMINGUEZ

## 2024-08-20 NOTE — PROGRESS NOTES
[facility] Care Coordination Contact    No longer active with [facility] community case management effective 2/29/24.  Reason for community disenrollment: DONNA Rutledge RN  [facility]  [phone]

## 2024-11-09 ENCOUNTER — HEALTH MAINTENANCE LETTER (OUTPATIENT)
Age: 69
End: 2024-11-09

## 2025-01-04 ENCOUNTER — HEALTH MAINTENANCE LETTER (OUTPATIENT)
Age: 70
End: 2025-01-04

## 2025-03-02 ENCOUNTER — HEALTH MAINTENANCE LETTER (OUTPATIENT)
Age: 70
End: 2025-03-02

## 2025-06-21 ENCOUNTER — HEALTH MAINTENANCE LETTER (OUTPATIENT)
Age: 70
End: 2025-06-21

## 2025-07-12 ENCOUNTER — HEALTH MAINTENANCE LETTER (OUTPATIENT)
Age: 70
End: 2025-07-12